# Patient Record
(demographics unavailable — no encounter records)

---

## 2024-10-30 NOTE — PAST MEDICAL HISTORY
[FreeTextEntry1] : no history of hospitalizations or suicide attempt. +cutting in teens/20's\par  past meds: cymbalta, klonopin, neurontin, prozac

## 2024-10-30 NOTE — RESULTS/DATA
[FreeTextEntry1] : 20: negative: UA, Syphilis, HepA,B,C. wnl: CMP, CBC, HbA1c:6.2, Chol:209, T, HDL:30, EKG: NSR,QTc:380, CXR neg\par  \par  weight: 19: 255lbs : 254lb, 1/10/20: 240lb\par  \par  annual physical exam completed 20\par  annual physical, labs and EKG scheduled in [facility] next month, will request copy of results.

## 2024-10-30 NOTE — FAMILY HISTORY
[FreeTextEntry1] : parents  because father alcoholic\par  brother: drug addict. has 2 sisters. aunt: bipolar committed suicide. nephew: bipolar

## 2024-10-30 NOTE — HISTORY OF PRESENT ILLNESS
[No] : no [FreeTextEntry1] : This is a 52 year old patient who presents for medication management.  The patient reports stable mood, good sleep and appetite.  The patient denies any symptoms of depression, joycelyn, or psychosis at this time.  The patient denies any anxiety that impairs their daily functioning. The patient denies any suicidal ideation, homicidal ideation, no auditory or visual hallucinations, or paranoid ideation.  The patient has no medical complaints. The patient denies any drug or alcohol use, and is smoking at times. I requested the patient's updated physical and labs from their PCP.  Coping skills were discussed and a safety plan was provided.  The patient was educated on the risks, benefits, and alternatives of their psychiatric medications.  The patient will not engage in psychotherapy at this time.  The patient consents to ongoing medication management.  Stable, at baseline.  Provided PHQ9. [de-identified] : \par

## 2024-10-30 NOTE — ASSESSMENT
[FreeTextEntry1] : Pt presents as psychiatrically stable.  No indication of any danger or distress.  Continue current meds.\par  \par  Follow up in 8 weeks

## 2024-10-30 NOTE — HISTORY OF PRESENT ILLNESS
[No] : no [FreeTextEntry1] : This is a 52 year old patient who presents for medication management.  The patient reports stable mood, good sleep and appetite.  The patient denies any symptoms of depression, joycelyn, or psychosis at this time.  The patient denies any anxiety that impairs their daily functioning. The patient denies any suicidal ideation, homicidal ideation, no auditory or visual hallucinations, or paranoid ideation.  The patient has no medical complaints. The patient denies any drug or alcohol use, and is smoking at times. I requested the patient's updated physical and labs from their PCP.  Coping skills were discussed and a safety plan was provided.  The patient was educated on the risks, benefits, and alternatives of their psychiatric medications.  The patient will not engage in psychotherapy at this time.  The patient consents to ongoing medication management.  Stable, at baseline.  Provided PHQ9. [de-identified] : \par

## 2024-10-30 NOTE — DISCUSSION/SUMMARY
[Potential impact of patient’s physical health conditions on psychiatric care?] : Potential impact of patient's physical health conditions on psychiatric care: No [Date of Last Physical Exam: _____] : Date of Last Physical Exam: [unfilled] [Date of Last Annual Labs: _____] : Date of Last Annual Labs: [unfilled] [Annual Review of Systems Completed?] : Annual Review of Systems Completed: Yes [Tobacco Screening Completed?] : Tobacco Screening Completed: Yes [Date of Last HbgA1c: _____] : Date of Last HbgA1c: [unfilled] [Date of Last Lipid Profile: _____] : Date of Last Lipid Profile: [unfilled] [Potential impact of patientâ??s physical health conditions on psychiatric care?] : Potential impact of patient's physical health conditions on psychiatric care: No [Does patient require any additional health services or referrals?] : Does patient require any additional health services or referrals: No

## 2024-10-30 NOTE — PHYSICAL EXAM
[None] : none [Normal] : good [2 - Borderline mentally ill] : 2 - Borderline mentally ill (subtle or suspected pathology) [3 - Minimally improved] : 3 - Minimally improved  (slightly better with little or no clinically meaningful reduction of symptoms. Represents very little change in basic clinical status, level of care, or functional capacity)

## 2024-10-30 NOTE — HISTORY OF PRESENT ILLNESS
[No] : no [FreeTextEntry1] : This is a 52 year old patient who presents for medication management.  The patient reports stable mood, good sleep and appetite.  The patient denies any symptoms of depression, joycelyn, or psychosis at this time.  The patient denies any anxiety that impairs their daily functioning. The patient denies any suicidal ideation, homicidal ideation, no auditory or visual hallucinations, or paranoid ideation.  The patient has no medical complaints. The patient denies any drug or alcohol use, and is smoking at times. I requested the patient's updated physical and labs from their PCP.  Coping skills were discussed and a safety plan was provided.  The patient was educated on the risks, benefits, and alternatives of their psychiatric medications.  The patient will not engage in psychotherapy at this time.  The patient consents to ongoing medication management.  Stable, at baseline.  Provided PHQ9. [de-identified] : \par

## 2024-10-30 NOTE — REASON FOR VISIT
[Patient preference] : as per patient preference [Telehealth (audio & video) - Individual/Group] : This visit was provided via telehealth using real-time 2-way audio visual technology. [Medical Office: ([facility])___] : The provider was located at the medical office in [unfilled]. [Home] : The patient, [unfilled], was located at home, [unfilled], at the time of the visit. [Verbal consent obtained from patient/other participant(s)] : Verbal consent for telehealth/telephonic services obtained from patient/other participant(s) [Patient's space is appropriate for telehealth and maintains privacy/confidentiality.] : Patient's space is appropriate for telehealth and maintains privacy/confidentiality. [FreeTextEntry4] : 263jf [FreeTextEntry5] : 060vd [FreeTextEntry1] : "I am well, had labs, ok."

## 2024-10-30 NOTE — REASON FOR VISIT
[Patient preference] : as per patient preference [Telehealth (audio & video) - Individual/Group] : This visit was provided via telehealth using real-time 2-way audio visual technology. [Medical Office: ([facility])___] : The provider was located at the medical office in [unfilled]. [Home] : The patient, [unfilled], was located at home, [unfilled], at the time of the visit. [Verbal consent obtained from patient/other participant(s)] : Verbal consent for telehealth/telephonic services obtained from patient/other participant(s) [Patient's space is appropriate for telehealth and maintains privacy/confidentiality.] : Patient's space is appropriate for telehealth and maintains privacy/confidentiality. [FreeTextEntry4] : 093oz [FreeTextEntry5] : 031ox [FreeTextEntry1] : "I am well, had labs, ok."

## 2024-10-30 NOTE — PLAN
[FreeTextEntry4] : mood is stable, goal to maintain stability, free of depression, mood lability  anxiety is low, goal to be free of anxiety on a daily basis  health is stable, requested physical and labs, had annual 1/24

## 2025-01-08 NOTE — HISTORY OF PRESENT ILLNESS
[No] : no [FreeTextEntry1] : This is a 53 year old patient who presents for medication management.  The patient reports stable mood, good sleep and appetite.  The patient denies any symptoms of depression, joycelyn, or psychosis at this time.  The patient denies any anxiety that impairs their daily functioning. The patient denies any suicidal ideation, homicidal ideation, no auditory or visual hallucinations, or paranoid ideation.  The patient has no medical complaints. The patient denies any drug or alcohol use, and is smoking at times. I requested the patient's updated physical and labs from their PCP.  Coping skills were discussed and a safety plan was provided.  The patient was educated on the risks, benefits, and alternatives of their psychiatric medications.  The patient will not engage in psychotherapy at this time.  The patient consents to ongoing medication management.  Stable, at baseline.  Emailed PHQ9 and GAD7. [de-identified] : \par

## 2025-01-08 NOTE — REASON FOR VISIT
[Patient preference] : as per patient preference [Telehealth (audio & video) - Individual/Group] : This visit was provided via telehealth using real-time 2-way audio visual technology. [Medical Office: ([facility])___] : The provider was located at the medical office in [unfilled]. [Home] : The patient, [unfilled], was located at home, [unfilled], at the time of the visit. [Patient's space is appropriate for telehealth and maintains privacy/confidentiality.] : Patient's space is appropriate for telehealth and maintains privacy/confidentiality. [Verbal consent obtained from patient/other participant(s)] : Verbal consent for telehealth/telephonic services obtained from patient/other participant(s) [FreeTextEntry4] : 142ah [FreeTextEntry5] : 038ei [FreeTextEntry1] : "I am good, no changes."

## 2025-02-06 NOTE — REASON FOR VISIT
Instrument tray ID:001 [Patient preference] : as per patient preference [Telehealth (audio & video) - Individual/Group] : This visit was provided via telehealth using real-time 2-way audio visual technology. [Medical Office: ([facility])___] : The provider was located at the medical office in [unfilled]. [Home] : The patient, [unfilled], was located at home, [unfilled], at the time of the visit. [Verbal consent obtained from patient/other participant(s)] : Verbal consent for telehealth/telephonic services obtained from patient/other participant(s) [Patient's space is appropriate for telehealth and maintains privacy/confidentiality.] : Patient's space is appropriate for telehealth and maintains privacy/confidentiality. [FreeTextEntry4] : 987or [FreeTextEntry5] : 420fl [FreeTextEntry1] : "I am well, had labs, ok."

## 2025-03-05 NOTE — HISTORY OF PRESENT ILLNESS
[No] : no [FreeTextEntry1] : This is a 53 year old patient who presents for medication management.  The patient reports stable mood, good sleep and appetite.  The patient denies any symptoms of depression, joycelyn, or psychosis at this time.  The patient denies any anxiety that impairs their daily functioning. The patient denies any suicidal ideation, homicidal ideation, no auditory or visual hallucinations, or paranoid ideation.  The patient has no medical complaints. The patient denies any drug or alcohol use, and is smoking at times. I requested the patient's updated physical and labs from their PCP.  Coping skills were discussed and a safety plan was provided.  The patient was educated on the risks, benefits, and alternatives of their psychiatric medications.  The patient will not engage in psychotherapy at this time.  The patient consents to ongoing medication management.  Stable, at baseline.  Emailed PHQ9 and GAD7. [de-identified] : \par

## 2025-03-05 NOTE — HISTORY OF PRESENT ILLNESS
[No] : no [FreeTextEntry1] : This is a 53 year old patient who presents for medication management.  The patient reports stable mood, good sleep and appetite.  The patient denies any symptoms of depression, joycelyn, or psychosis at this time.  The patient denies any anxiety that impairs their daily functioning. The patient denies any suicidal ideation, homicidal ideation, no auditory or visual hallucinations, or paranoid ideation.  The patient has no medical complaints. The patient denies any drug or alcohol use, and is smoking at times. I requested the patient's updated physical and labs from their PCP.  Coping skills were discussed and a safety plan was provided.  The patient was educated on the risks, benefits, and alternatives of their psychiatric medications.  The patient will not engage in psychotherapy at this time.  The patient consents to ongoing medication management.  Stable, at baseline.  Emailed PHQ9 and GAD7. [de-identified] : \par

## 2025-03-05 NOTE — DISCUSSION/SUMMARY
[Potential impact of patient’s physical health conditions on psychiatric care?] : Potential impact of patient's physical health conditions on psychiatric care: No [Date of Last Physical Exam: _____] : Date of Last Physical Exam: [unfilled] [Date of Last Annual Labs: _____] : Date of Last Annual Labs: [unfilled] [Annual Review of Systems Completed?] : Annual Review of Systems Completed: Yes [Tobacco Screening Completed?] : Tobacco Screening Completed: Yes [Date of Last HbgA1c: _____] : Date of Last HbgA1c: [unfilled] [Date of Last Lipid Profile: _____] : Date of Last Lipid Profile: [unfilled] [Potential impact of patientâ??s physical health conditions on psychiatric care?] : Potential impact of patient's physical health conditions on psychiatric care: No [Does patient require any additional health services or referrals?] : Does patient require any additional health services or referrals: No [Denied] : Denied [No] : No [Completed and in chart] : Completed and in chart [Advised to schedule] : Advised to schedule [Referred to PCP] : Referred to PCP [Yes] : Yes [Patient is not prescribed antipsychotic medication] : Patient is not prescribed antipsychotic medication [Does patient use tobacco products?] : Patient does not use tobacco products [Does patient use medical marijuana?] : Patient does not use medical marijuana. [Patient has been tested for HIV?] : Patient has not been tested for HIV [Patient has been tested for Hepatitis?] : Patient has not been tested for hepatitis [Patient would like to be tested/re-tested?] : patient would not like to be tested/re-tested [Current or past COVID-19 diagnosis?] : Patient does not have a current or past COVID-19 diagnosis [Vaccinated?] : is vaccinated [Education provided about COVID-19?] : Education provided about COVID-19

## 2025-03-05 NOTE — REASON FOR VISIT
[Patient preference] : as per patient preference [Telehealth (audio & video) - Individual/Group] : This visit was provided via telehealth using real-time 2-way audio visual technology. [Medical Office: ([facility])___] : The provider was located at the medical office in [unfilled]. [Home] : The patient, [unfilled], was located at home, [unfilled], at the time of the visit. [Patient's space is appropriate for telehealth and maintains privacy/confidentiality.] : Patient's space is appropriate for telehealth and maintains privacy/confidentiality. [Verbal consent obtained from patient/other participant(s)] : Verbal consent for telehealth/telephonic services obtained from patient/other participant(s) [FreeTextEntry4] : 965rl [FreeTextEntry5] : 252si [FreeTextEntry1] : "I am good, no changes, had physical and labs."

## 2025-03-05 NOTE — REASON FOR VISIT
[Patient preference] : as per patient preference [Telehealth (audio & video) - Individual/Group] : This visit was provided via telehealth using real-time 2-way audio visual technology. [Medical Office: ([facility])___] : The provider was located at the medical office in [unfilled]. [Home] : The patient, [unfilled], was located at home, [unfilled], at the time of the visit. [Patient's space is appropriate for telehealth and maintains privacy/confidentiality.] : Patient's space is appropriate for telehealth and maintains privacy/confidentiality. [Verbal consent obtained from patient/other participant(s)] : Verbal consent for telehealth/telephonic services obtained from patient/other participant(s) [FreeTextEntry4] : 094oo [FreeTextEntry5] : 644kl [FreeTextEntry1] : "I am good, no changes, had physical and labs."

## 2025-03-05 NOTE — PHYSICAL EXAM
[3 - Minimally improved] : 3 - Minimally improved  (slightly better with little or no clinically meaningful reduction of symptoms. Represents very little change in basic clinical status, level of care, or functional capacity) [None] : none [Normal] : good [2 - Borderline mentally ill] : 2 - Borderline mentally ill (subtle or suspected pathology) [4 - No change] : 4 - No change  (symptoms remain essentially unchanged)

## 2025-03-05 NOTE — HISTORY OF PRESENT ILLNESS
[No] : no [FreeTextEntry1] : This is a 53 year old patient who presents for medication management.  The patient reports stable mood, good sleep and appetite.  The patient denies any symptoms of depression, joycelyn, or psychosis at this time.  The patient denies any anxiety that impairs their daily functioning. The patient denies any suicidal ideation, homicidal ideation, no auditory or visual hallucinations, or paranoid ideation.  The patient has no medical complaints. The patient denies any drug or alcohol use, and is smoking at times. I requested the patient's updated physical and labs from their PCP.  Coping skills were discussed and a safety plan was provided.  The patient was educated on the risks, benefits, and alternatives of their psychiatric medications.  The patient will not engage in psychotherapy at this time.  The patient consents to ongoing medication management.  Stable, at baseline.  Emailed PHQ9 and GAD7. [de-identified] : \par

## 2025-03-05 NOTE — REASON FOR VISIT
[Patient preference] : as per patient preference [Telehealth (audio & video) - Individual/Group] : This visit was provided via telehealth using real-time 2-way audio visual technology. [Medical Office: ([facility])___] : The provider was located at the medical office in [unfilled]. [Home] : The patient, [unfilled], was located at home, [unfilled], at the time of the visit. [Patient's space is appropriate for telehealth and maintains privacy/confidentiality.] : Patient's space is appropriate for telehealth and maintains privacy/confidentiality. [Verbal consent obtained from patient/other participant(s)] : Verbal consent for telehealth/telephonic services obtained from patient/other participant(s) [FreeTextEntry4] : 359ab [FreeTextEntry5] : 754pv [FreeTextEntry1] : "I am good, no changes, had physical and labs."

## 2025-03-05 NOTE — PLAN
[FreeTextEntry4] : mood is stable, goal to maintain stability, free of depression, mood lability  anxiety is low, goal to be free of anxiety on a daily basis  health is stable, requested physical and labs, had annual 2/25

## 2025-04-30 NOTE — REASON FOR VISIT
[Patient preference] : as per patient preference [Telehealth (audio & video) - Individual/Group] : This visit was provided via telehealth using real-time 2-way audio visual technology. [Medical Office: ([facility])___] : The provider was located at the medical office in [unfilled]. [Home] : The patient, [unfilled], was located at home, [unfilled], at the time of the visit. [Patient's space is appropriate for telehealth and maintains privacy/confidentiality.] : Patient's space is appropriate for telehealth and maintains privacy/confidentiality. [Verbal consent obtained from patient/other participant(s)] : Verbal consent for telehealth/telephonic services obtained from patient/other participant(s) [FreeTextEntry4] : 994wv [FreeTextEntry5] : 335ex [FreeTextEntry1] : "I am fine."

## 2025-04-30 NOTE — PHYSICAL EXAM
[None] : none [Normal] : good [2 - Borderline mentally ill] : 2 - Borderline mentally ill (subtle or suspected pathology) [4 - No change] : 4 - No change  (symptoms remain essentially unchanged)

## 2025-04-30 NOTE — HISTORY OF PRESENT ILLNESS
[No] : no [FreeTextEntry1] : This is a 53 year old patient who presents for medication management.  The patient reports stable mood, good sleep and appetite.  The patient denies any symptoms of depression, joycelyn, or psychosis at this time.  The patient denies any anxiety that impairs their daily functioning. The patient denies any suicidal ideation, homicidal ideation, no auditory or visual hallucinations, or paranoid ideation.  The patient has no medical complaints. The patient denies any drug or alcohol use, and is smoking at times. I requested the patient's updated physical and labs from their PCP.  Coping skills were discussed and a safety plan was provided.  The patient was educated on the risks, benefits, and alternatives of their psychiatric medications.  The patient will not engage in psychotherapy at this time.  The patient consents to ongoing medication management.  Stable, at baseline.  Emailed PHQ9 and GAD7. [de-identified] : \par

## 2025-06-25 NOTE — HISTORY OF PRESENT ILLNESS
[No] : no [FreeTextEntry1] : This is a 53 year old patient who presents for medication management.  The patient reports stable mood, good sleep and appetite.  The patient denies any symptoms of depression, joycelyn, or psychosis at this time.  The patient denies any anxiety that impairs their daily functioning. The patient denies any suicidal ideation, homicidal ideation, no auditory or visual hallucinations, or paranoid ideation.  The patient has no medical complaints. The patient denies any drug or alcohol use, and is smoking at times. I requested the patient's updated physical and labs from their PCP.  Coping skills were discussed and a safety plan was provided.  The patient was educated on the risks, benefits, and alternatives of their psychiatric medications.  The patient will not engage in psychotherapy at this time.  The patient consents to ongoing medication management.  Stable, at baseline.  Emailed PHQ9 and GAD7. [de-identified] : \par

## 2025-06-25 NOTE — REASON FOR VISIT
[Patient preference] : as per patient preference [Telehealth (audio & video) - Individual/Group] : This visit was provided via telehealth using real-time 2-way audio visual technology. [Medical Office: ([facility])___] : The provider was located at the medical office in [unfilled]. [Home] : The patient, [unfilled], was located at home, [unfilled], at the time of the visit. [Patient's space is appropriate for telehealth and maintains privacy/confidentiality.] : Patient's space is appropriate for telehealth and maintains privacy/confidentiality. [Verbal consent obtained from patient/other participant(s)] : Verbal consent for telehealth/telephonic services obtained from patient/other participant(s) [FreeTextEntry4] : 425rj [FreeTextEntry5] : 026hx [FreeTextEntry1] : "I am well."